# Patient Record
Sex: FEMALE
[De-identification: names, ages, dates, MRNs, and addresses within clinical notes are randomized per-mention and may not be internally consistent; named-entity substitution may affect disease eponyms.]

---

## 2018-12-15 PROBLEM — Z00.00 ENCOUNTER FOR PREVENTIVE HEALTH EXAMINATION: Status: ACTIVE | Noted: 2018-12-15

## 2019-01-14 ENCOUNTER — APPOINTMENT (OUTPATIENT)
Dept: INTERNAL MEDICINE | Facility: CLINIC | Age: 79
End: 2019-01-14
Payer: MEDICARE

## 2019-01-14 VITALS
BODY MASS INDEX: 23.57 KG/M2 | SYSTOLIC BLOOD PRESSURE: 165 MMHG | DIASTOLIC BLOOD PRESSURE: 90 MMHG | HEART RATE: 89 BPM | OXYGEN SATURATION: 98 % | HEIGHT: 63 IN | WEIGHT: 133 LBS | TEMPERATURE: 98.1 F

## 2019-01-14 DIAGNOSIS — R53.83 OTHER FATIGUE: ICD-10-CM

## 2019-01-14 DIAGNOSIS — Z78.0 ASYMPTOMATIC MENOPAUSAL STATE: ICD-10-CM

## 2019-01-14 DIAGNOSIS — Z86.19 PERSONAL HISTORY OF OTHER INFECTIOUS AND PARASITIC DISEASES: ICD-10-CM

## 2019-01-14 DIAGNOSIS — Z12.11 ENCOUNTER FOR SCREENING FOR MALIGNANT NEOPLASM OF COLON: ICD-10-CM

## 2019-01-14 PROCEDURE — 99204 OFFICE O/P NEW MOD 45 MIN: CPT

## 2019-02-15 ENCOUNTER — APPOINTMENT (OUTPATIENT)
Dept: INTERNAL MEDICINE | Facility: CLINIC | Age: 79
End: 2019-02-15
Payer: MEDICARE

## 2019-02-15 VITALS — SYSTOLIC BLOOD PRESSURE: 144 MMHG | DIASTOLIC BLOOD PRESSURE: 86 MMHG

## 2019-02-15 VITALS
HEIGHT: 63 IN | WEIGHT: 128 LBS | DIASTOLIC BLOOD PRESSURE: 80 MMHG | BODY MASS INDEX: 22.68 KG/M2 | OXYGEN SATURATION: 98 % | SYSTOLIC BLOOD PRESSURE: 172 MMHG | HEART RATE: 79 BPM | TEMPERATURE: 97.2 F

## 2019-02-15 VITALS — SYSTOLIC BLOOD PRESSURE: 143 MMHG | DIASTOLIC BLOOD PRESSURE: 101 MMHG

## 2019-02-15 LAB — HEMOCCULT STL QL IA: NEGATIVE

## 2019-02-15 PROCEDURE — 99213 OFFICE O/P EST LOW 20 MIN: CPT | Mod: 25

## 2019-02-15 PROCEDURE — 90670 PCV13 VACCINE IM: CPT

## 2019-02-15 PROCEDURE — G0009: CPT

## 2019-02-15 RX ORDER — LISINOPRIL AND HYDROCHLOROTHIAZIDE TABLETS 20; 12.5 MG/1; MG/1
20-12.5 TABLET ORAL
Qty: 90 | Refills: 0 | Status: COMPLETED | COMMUNITY
Start: 2018-11-01

## 2019-02-19 RX ORDER — LISINOPRIL AND HYDROCHLOROTHIAZIDE TABLETS 20; 25 MG/1; MG/1
20-25 TABLET ORAL DAILY
Qty: 90 | Refills: 1 | Status: COMPLETED | COMMUNITY
Start: 2019-01-14 | End: 2019-02-19

## 2019-04-23 ENCOUNTER — APPOINTMENT (OUTPATIENT)
Dept: INTERNAL MEDICINE | Facility: CLINIC | Age: 79
End: 2019-04-23
Payer: MEDICARE

## 2019-04-23 VITALS
HEART RATE: 81 BPM | HEIGHT: 63 IN | TEMPERATURE: 97.6 F | OXYGEN SATURATION: 96 % | DIASTOLIC BLOOD PRESSURE: 96 MMHG | BODY MASS INDEX: 23.04 KG/M2 | SYSTOLIC BLOOD PRESSURE: 160 MMHG | WEIGHT: 130 LBS

## 2019-04-23 DIAGNOSIS — E03.9 HYPOTHYROIDISM, UNSPECIFIED: ICD-10-CM

## 2019-04-23 DIAGNOSIS — R01.1 CARDIAC MURMUR, UNSPECIFIED: ICD-10-CM

## 2019-04-23 DIAGNOSIS — I10 ESSENTIAL (PRIMARY) HYPERTENSION: ICD-10-CM

## 2019-04-23 DIAGNOSIS — Z23 ENCOUNTER FOR IMMUNIZATION: ICD-10-CM

## 2019-04-23 PROCEDURE — 99214 OFFICE O/P EST MOD 30 MIN: CPT

## 2019-04-23 RX ORDER — IRBESARTAN 150 MG/1
150 TABLET ORAL
Qty: 90 | Refills: 1 | Status: ACTIVE | COMMUNITY
Start: 2018-10-11 | End: 1900-01-01

## 2019-04-23 RX ORDER — IRBESARTAN 75 MG/1
75 TABLET ORAL
Qty: 90 | Refills: 1 | Status: ACTIVE | COMMUNITY
Start: 2018-10-11 | End: 1900-01-01

## 2019-04-25 PROBLEM — R01.1 SYSTOLIC MURMUR: Status: ACTIVE | Noted: 2019-04-25

## 2019-04-25 NOTE — PHYSICAL EXAM
[No Respiratory Distress] : no respiratory distress  [Clear to Auscultation] : lungs were clear to auscultation bilaterally [No Accessory Muscle Use] : no accessory muscle use [Regular Rhythm] : with a regular rhythm [de-identified] : systolic murmur most prominent by aortic valve location

## 2019-04-25 NOTE — HISTORY OF PRESENT ILLNESS
[FreeTextEntry1] : f/u htn.  Pna vaccine. [de-identified] : Has brought both wrist cuff and quite old upper arm cuff with her today.  Upper arm cuff reading error.  Wrist cuff reading recorded, also took manually with our upper arm cuff.

## 2019-11-30 ENCOUNTER — TRANSCRIPTION ENCOUNTER (OUTPATIENT)
Age: 79
End: 2019-11-30

## 2023-12-31 PROBLEM — Z23 NEED FOR VACCINATION WITH 13-POLYVALENT PNEUMOCOCCAL CONJUGATE VACCINE: Status: ACTIVE | Noted: 2019-02-15
